# Patient Record
Sex: FEMALE | Race: WHITE | NOT HISPANIC OR LATINO
[De-identification: names, ages, dates, MRNs, and addresses within clinical notes are randomized per-mention and may not be internally consistent; named-entity substitution may affect disease eponyms.]

---

## 2024-01-24 ENCOUNTER — APPOINTMENT (OUTPATIENT)
Dept: ORTHOPEDIC SURGERY | Facility: CLINIC | Age: 24
End: 2024-01-24
Payer: COMMERCIAL

## 2024-01-24 DIAGNOSIS — S39.012A STRAIN OF MUSCLE, FASCIA AND TENDON OF LOWER BACK, INITIAL ENCOUNTER: ICD-10-CM

## 2024-01-24 DIAGNOSIS — Z00.00 ENCOUNTER FOR GENERAL ADULT MEDICAL EXAMINATION W/OUT ABNORMAL FINDINGS: ICD-10-CM

## 2024-01-24 DIAGNOSIS — F17.200 NICOTINE DEPENDENCE, UNSPECIFIED, UNCOMPLICATED: ICD-10-CM

## 2024-01-24 PROCEDURE — 99203 OFFICE O/P NEW LOW 30 MIN: CPT

## 2024-01-24 PROCEDURE — 72110 X-RAY EXAM L-2 SPINE 4/>VWS: CPT

## 2024-01-24 RX ORDER — NAPROXEN 500 MG/1
500 TABLET ORAL
Qty: 30 | Refills: 0 | Status: ACTIVE | COMMUNITY
Start: 2024-01-24 | End: 1900-01-01

## 2024-01-24 RX ORDER — METHYLPREDNISOLONE 4 MG/1
4 TABLET ORAL
Qty: 1 | Refills: 0 | Status: ACTIVE | COMMUNITY
Start: 2024-01-24 | End: 1900-01-01

## 2024-01-24 NOTE — HISTORY OF PRESENT ILLNESS
[de-identified] : 1/24/24: This is Ms. LUCI JONES a 24-year-old female who comes in today complaining of lower back pain that began about 2 weeks ago. There was no inciting injury. She noticed the pain after prolonged sitting. She had worsening pain after bending forward. She felt pain radiate down her leg. She went to urgent care where she was prescribed Naproxen. She is unable to take muscle relaxers due to interactions with other medications. She has used heat and OTC pain medication as well.  She had some relief initially but feels the pain is returning. She is experiencing popping sensations. She states that she has had to leave work due to being unable to sit for prolonged periods. She is not currently experiencing numbness, tingling, radiation. Denies prior injuries/ bowel or bladder incontinence/ saddle anesthesia. She works a desk job. She exercises but has stopped due to this pain.

## 2024-01-24 NOTE — IMAGING
[de-identified] : Back:  - No obvious deformity - No pain with palpation of spinous processes or paraspinal musculature - Significant pain with palpation R>L lumbosacral paraspinal muscles  - No pain with SI palpation  - ROM intact throughout flexion, extension, sidebending, and rotation. Pain with sidebending bilaterally and rotation - 5/5 strength throughout LE evaluation bilaterally - No pain with BALA - Negative straight leg raise bilaterally - Distally neurovascularly intact [No bony abnormalities] : No bony abnormalities

## 2024-01-24 NOTE — ASSESSMENT
[FreeTextEntry1] : 2 weeks of lower back pain after bending forward.  Possibly some radicular symptoms initially which she is now no longer having.  Significant pain with palpation over the bilateral lumbosacral paraspinals.  X-rays of the lumbar spine without acute or degenerative changes  -Medrol Dosepak.  Naproxen to take after finishing Medrol Dosepak -Physical therapy referral -Will avoid use of muscle relaxers due to medical comorbidities/medications -Follow-up in 3 to 4 weeks

## 2024-02-21 ENCOUNTER — APPOINTMENT (OUTPATIENT)
Dept: ORTHOPEDIC SURGERY | Facility: CLINIC | Age: 24
End: 2024-02-21